# Patient Record
Sex: MALE | Race: WHITE | NOT HISPANIC OR LATINO | Employment: OTHER | URBAN - METROPOLITAN AREA
[De-identification: names, ages, dates, MRNs, and addresses within clinical notes are randomized per-mention and may not be internally consistent; named-entity substitution may affect disease eponyms.]

---

## 2018-10-28 PROBLEM — K35.30 ACUTE APPENDICITIS WITH LOCALIZED PERITONITIS, WITHOUT PERFORATION, ABSCESS, OR GANGRENE: Status: ACTIVE | Noted: 2018-10-28

## 2019-07-18 ENCOUNTER — PATIENT OUTREACH (OUTPATIENT)
Dept: ADMINISTRATIVE | Facility: HOSPITAL | Age: 29
End: 2019-07-18

## 2019-07-18 NOTE — PROGRESS NOTES
Health Maintenance Due   Topic Date Due    Lipid Panel  1991    TETANUS VACCINE  09/16/2009       Chart review completed 07/18/2019

## 2019-07-18 NOTE — LETTER
July 26, 2019    Manuel Duffy  [address] [place] [address]             [facility]  [address]  [facility] [address]  Phone: [phone] Dear Mr. Duffy:    We have tried to reach you by mychart unsuccessfully.    [facility] is committed to your overall health.  To help you get the most out of each of your visits, we will review your information to make sure you are up to date on all of your recommended tests and/or procedures.       YOUR PCP  has found that your chart shows you may be due for the following:     Lipid Panel   TETANUS VACCINE     If you have had any of the above done at another facility, please bring the records with you or FAX them to [phone] so that your record at [facility] will be complete.  If you have not had any of these tests or procedures done recently and would like to complete this testing, please call [phone] or send a message through your [facility] portal to your provider's office.     If you have an upcoming scheduled appointment for the above test and/or procedures, please disregard this letter.     Latoya GRAY, Panel Care Coordinator, [phone]   [facility]   [address] [facility] [address]   [place], [place] [address] [phone] (f)         If you have any questions or concerns, please don't hesitate to call.

## 2021-05-12 ENCOUNTER — PATIENT MESSAGE (OUTPATIENT)
Dept: RESEARCH | Facility: HOSPITAL | Age: 31
End: 2021-05-12